# Patient Record
(demographics unavailable — no encounter records)

---

## 2024-10-14 NOTE — HISTORY OF PRESENT ILLNESS
[Dull/Aching] : dull/aching [Shooting] : shooting [] : yes [Intermittent] : intermittent [Household chores] : household chores [Social interactions] : social interactions [Full time] : Work status: full time [de-identified] : 10/14/2024: 29 yo [facility] - pt is here today for neck pain and lwoer back pain , pain started 10/05/24, she woke up one morning and started having pain.   Seen by chiro with xrays and adjustment which help. Still stiff with discomfort . Was radiating to shoulders but now improved. Worried bc chiro said she had a lot wrong with her  Also back pain   xrays today: C spine - normal  L spine - normal    [employer]  [FreeTextEntry1] : NECK [FreeTextEntry6] : ''DISCOMFORT'' [FreeTextEntry7] : from neck to left shoulder and sometimes to lumbar and down her left leg. sometimes has numbness and tingling. [FreeTextEntry9] : saw by chiropractor, used some advil [de-identified] : certain movements  [de-identified] :  for [employer]

## 2024-10-14 NOTE — DISCUSSION/SUMMARY
[de-identified] : reviewed the case and the imaging with the patient  neck and lower back pain  discussion of the condition and treatment options cautions discussed questions answered discussion of natural history of the condition and what the next step would be PT  MDP   long discussion that